# Patient Record
(demographics unavailable — no encounter records)

---

## 2024-12-03 NOTE — ASSESSMENT
[FreeTextEntry1] : He had cerumen which was removed.  He has a history of recurrent sinusitis.  He does not have symptoms at this time.  He does suffer from mild allergies.  Plan -Findings and management options were discussed with the patient. - Continue good ear hygiene - Wax removal drops as needed - Noise precautions recommended - He declined an audiogram to check his hearing - Nasal saline irrigations, nasal steroid spray, decongestant as needed - I spoke with him about further workup including nasal endoscopy, allergy evaluation, and CT scan of the sinuses.  He is going to hold off for now.  If he would like to proceed with further workup, he will let me know - I will see him back in 1 year if he is doing well.  I did recommend he return if he has a sinus infection so I may see the change in his exam.

## 2024-12-03 NOTE — PHYSICAL EXAM
[TextEntry] : General: The patient was alert, oriented and in no distress. Voice was clear.  Face: The patient had no facial asymmetry or mass. The skin was unremarkable.  Ears: Hearing normal to conversational voice External ears were normal without deformity. Ear canals- cerumen impaction  PROCEDURE- EAR MICROSCOPY AND CERUMEN REMOVAL  Indication: cerumen removal Under the microscope, obstructing cerumen was removed atraumatically from the left ear with a suction, curette and/or forceps.  There was a small piece on the right which was also removed.  Canals: normal. no inflammation. Tympanic membranes: Intact. no perforation or effusion.  Nose: The external nose had no significant deformity.  On anterior rhinoscopy, the nasal mucosa was clear. The anterior septum was grossly midline. There were no visualized polyps, purulence or masses.  Oral cavity: Oral mucosa- normal. Oral and base of tongue- clear and without mass. Gingival and buccal mucosa- moist and without lesions. Palate- the palate moved well. There was no cleft palate. There appeared to be good salivary flow. Oral cavity/oropharynx- no pus, erythema or mass  Neck: The neck was symmetrical. The parotid and submandibular glands were normal without masses. The trachea was midline and there was no unusual crepitus. Thyroid was smooth and nontender and no masses were palpated. No masses  Lymphatics: Cervical adenopathy- none.

## 2024-12-03 NOTE — CONSULT LETTER
[Dear  ___] : Dear  [unfilled], [Courtesy Letter:] : I had the pleasure of seeing your patient, [unfilled], in my office today. [Please see my note below.] : Please see my note below. [Consult Closing:] : Thank you very much for allowing me to participate in the care of this patient.  If you have any questions, please do not hesitate to contact me. [Sincerely,] : Sincerely, [FreeTextEntry3] : Lian Ayala MD [facility] at [facility] Otolaryngology  Head & Neck Surgery [facility]   Department of Otolaryngology [facility] [employer] at [facility]  Office Tel: [phone]

## 2024-12-03 NOTE — HISTORY OF PRESENT ILLNESS
[de-identified] : ANTHONY PETER is a 38 year old patient Here to check his ears.  He thinks he may have wax buildup.  He has no otalgia, otorrhea, tinnitus or dizziness.  He had a dip in his hearing at 6000 Hz at his last test  He also has a history of sinus blockage.  Whenever he gets a cold, he developed sinus pressure.  The infection typically resolves in a couple of weeks.  He does not typically take antibiotics.  Otherwise, he does not feel that he has a lot of nasal obstruction.  He does have nasal congestion  No history of recurrent middle ear infections, prior otologic surgery, ear trauma, or excessive noise exposure He has mild allergies.  He has not had testing He gets approximately 2 sinus infections per year He does not have a history of nasal trauma or nasal/sinus surgery No pets at home No history of reflux He does not smoke He did have phantosmia in 2023.  It has since resolved. He uses nasal saline, Nasonex, and a decongestant as needed